# Patient Record
Sex: FEMALE | Race: WHITE | Employment: FULL TIME | URBAN - METROPOLITAN AREA
[De-identification: names, ages, dates, MRNs, and addresses within clinical notes are randomized per-mention and may not be internally consistent; named-entity substitution may affect disease eponyms.]

---

## 2020-06-10 ENCOUNTER — OFFICE VISIT (OUTPATIENT)
Dept: FAMILY MEDICINE | Facility: CLINIC | Age: 55
End: 2020-06-10
Payer: COMMERCIAL

## 2020-06-10 VITALS
OXYGEN SATURATION: 100 % | HEART RATE: 84 BPM | TEMPERATURE: 98.4 F | SYSTOLIC BLOOD PRESSURE: 140 MMHG | BODY MASS INDEX: 24.63 KG/M2 | DIASTOLIC BLOOD PRESSURE: 82 MMHG | WEIGHT: 139 LBS | HEIGHT: 63 IN

## 2020-06-10 DIAGNOSIS — M79.7 FIBROMYALGIA: ICD-10-CM

## 2020-06-10 DIAGNOSIS — R03.0 ELEVATED BLOOD PRESSURE READING WITHOUT DIAGNOSIS OF HYPERTENSION: ICD-10-CM

## 2020-06-10 DIAGNOSIS — M16.12 OSTEOARTHRITIS OF LEFT HIP, UNSPECIFIED OSTEOARTHRITIS TYPE: ICD-10-CM

## 2020-06-10 DIAGNOSIS — Z01.818 PREOP GENERAL PHYSICAL EXAM: Primary | ICD-10-CM

## 2020-06-10 PROBLEM — F33.9 RECURRENT MAJOR DEPRESSIVE DISORDER (H): Status: ACTIVE | Noted: 2020-06-10

## 2020-06-10 LAB — HGB BLD-MCNC: 13 G/DL (ref 11.7–15.7)

## 2020-06-10 PROCEDURE — 36415 COLL VENOUS BLD VENIPUNCTURE: CPT | Performed by: FAMILY MEDICINE

## 2020-06-10 PROCEDURE — 99203 OFFICE O/P NEW LOW 30 MIN: CPT | Performed by: FAMILY MEDICINE

## 2020-06-10 PROCEDURE — 85018 HEMOGLOBIN: CPT | Performed by: FAMILY MEDICINE

## 2020-06-10 RX ORDER — DEXTROAMPHETAMINE SACCHARATE, AMPHETAMINE ASPARTATE MONOHYDRATE, DEXTROAMPHETAMINE SULFATE AND AMPHETAMINE SULFATE 5; 5; 5; 5 MG/1; MG/1; MG/1; MG/1
20 CAPSULE, EXTENDED RELEASE ORAL
COMMUNITY
Start: 2019-09-12

## 2020-06-10 RX ORDER — METHYLPHENIDATE HYDROCHLORIDE 10 MG/1
10 TABLET ORAL
COMMUNITY
Start: 2019-09-13

## 2020-06-10 RX ORDER — DULOXETIN HYDROCHLORIDE 60 MG/1
CAPSULE, DELAYED RELEASE ORAL
COMMUNITY
Start: 2019-09-12

## 2020-06-10 RX ORDER — HYDROCODONE BITARTRATE AND ACETAMINOPHEN 5; 325 MG/1; MG/1
1 TABLET ORAL EVERY 6 HOURS PRN
Qty: 12 TABLET | Refills: 0 | Status: SHIPPED | OUTPATIENT
Start: 2020-06-10 | End: 2020-06-13

## 2020-06-10 SDOH — HEALTH STABILITY: MENTAL HEALTH: HOW OFTEN DO YOU HAVE 6 OR MORE DRINKS ON ONE OCCASION?: NEVER

## 2020-06-10 SDOH — HEALTH STABILITY: MENTAL HEALTH: HOW OFTEN DO YOU HAVE A DRINK CONTAINING ALCOHOL?: 4 OR MORE TIMES A WEEK

## 2020-06-10 SDOH — HEALTH STABILITY: MENTAL HEALTH: HOW MANY STANDARD DRINKS CONTAINING ALCOHOL DO YOU HAVE ON A TYPICAL DAY?: 1 OR 2

## 2020-06-10 ASSESSMENT — MIFFLIN-ST. JEOR: SCORE: 1199.63

## 2020-06-10 NOTE — PROGRESS NOTES
[facility]  [address]  [facility] [id number] [phone]  Dept: [phone]    PRE-OP EVALUATION:  Today's date: 6/10/2020    Carito John (: 1965) presents for pre-operative evaluation assessment as requested by Dr. rdz.  She requires evaluation and anesthesia risk assessment prior to undergoing surgery/procedure for treatment of left hip total replacement .    Proposed Surgery/ Procedure: left hip replacement -   Date of Surgery/ Procedure: 06/15/2020  Time of Surgery/ Procedure:  6:30am  Hospital/Surgical Facility: [facility]     Primary Physician: Melinda Hdez  Type of Anesthesia Anticipated: General    Patient has a Health Care Directive or Living Will:  NO    1. NO - Do you have a history of heart attack, stroke, stent, bypass or surgery on an artery in the head, neck, heart or legs?  2. NO - Do you ever have any pain or discomfort in your chest?  3. NO - Do you have a history of  Heart Failure?  4. NO - Are you troubled by shortness of breath when: walking on the level, up a slight hill or at night?  5. NO - Do you currently have a cold, bronchitis or other respiratory infection?  6. NO - Do you have a cough, shortness of breath or wheezing?  7. NO - Do you sometimes get pains in the calves of your legs when you walk?  8. NO - Do you or anyone in your family have previous history of blood clots?  9. YES - DO YOU OR DOES ANYONE IN YOUR FAMILY HAVE A SERIOUS BLEEDING PROBLEM SUCH AS PROLONGED BLEEDING FOLLOWING SURGERIES OR CUTS? Father - bleeding due to blood thinners  10. NO - Have you ever had problems with anemia or been told to take iron pills?  11. NO - Have you had any abnormal blood loss such as black, tarry or bloody stools, or abnormal vaginal bleeding?  12. NO - Have you ever had a blood transfusion?  13. NO - Have you or any of your relatives ever had problems with anesthesia?  14. NO - Do you have sleep apnea, excessive snoring or daytime  drowsiness?  15. NO - Do you have any prosthetic heart valves?  16. NO - Do you have prosthetic joints?  17. NO - Is there any chance that you may be pregnant?      HPI:     HPI related to upcoming procedure: 55 y/o female her for preoperative evaluation for left hip surgery for OA and possibly osteonecrosis      See problem list for active medical problems.  Problems all longstanding and stable, except as noted/documented.  See ROS for pertinent symptoms related to these conditions.      MEDICAL HISTORY:     Patient Active Problem List    Diagnosis Date Noted     Osteoarthritis of left hip 06/10/2020     Priority: Medium     Recurrent major depressive disorder (H) 06/10/2020     Priority: Medium     Fibromyalgia 06/10/2020     Priority: Medium      No past medical history on file.  Past Surgical History:   Procedure Laterality Date     C BREAST AUGMENTATION  04/2016     DENTAL SURGERY      wisdom teeth and implant     Current Outpatient Medications   Medication Sig Dispense Refill     amphetamine-dextroamphetamine (ADDERALL XR) 20 MG 24 hr capsule Take 20 mg by mouth       diphenhydrAMINE HCl (BENADRYL ALLERGY PO)        DULoxetine (CYMBALTA) 60 MG capsule TAKE 1 CAPSULE BY MOUTH EVERY DAY (DOSE INCREASE)       methylphenidate (RITALIN) 10 MG tablet Take 10 mg by mouth       Omega-3 Fatty Acids (FISH OIL PO)        TURMERIC PO        OTC products: was on ibuprofen but held prior to surgery     No Known Allergies   Latex Allergy: NO    Social History     Tobacco Use     Smoking status: Former Smoker     Smokeless tobacco: Never Used   Substance Use Topics     Alcohol use: Yes     Frequency: 4 or more times a week     Drinks per session: 1 or 2     Binge frequency: Never     History   Drug Use     Comment: medical [facility]       REVIEW OF SYSTEMS:   CONSTITUTIONAL: NEGATIVE for fever, chills, change in weight  INTEGUMENTARY/SKIN: NEGATIVE for worrisome rashes, moles or lesions  EYES: NEGATIVE for vision changes or  "irritation  ENT/MOUTH: NEGATIVE for ear, mouth and throat problems  RESP: NEGATIVE for significant cough or SOB  BREAST: NEGATIVE for masses, tenderness or discharge  CV: NEGATIVE for chest pain, palpitations or peripheral edema  GI: NEGATIVE for nausea, abdominal pain, heartburn, or change in bowel habits  : NEGATIVE for frequency, dysuria, or hematuria  MUSCULOSKELETAL:hip pain  NEURO: NEGATIVE for weakness, dizziness or paresthesias  ENDOCRINE: NEGATIVE for temperature intolerance, skin/hair changes  HEME: NEGATIVE for bleeding problems  PSYCHIATRIC: NEGATIVE for changes in mood or affect    EXAM:   BP (!) 140/82   Pulse 84   Temp 98.4  F (36.9  C) (Tympanic)   Ht 1.6 m (5' 3\")   Wt 63 kg (139 lb)   SpO2 100%   BMI 24.62 kg/m      GENERAL APPEARANCE: healthy, alert and no distress     EYES: EOMI, PERRL     HENT: ear canals and TM's normal and nose and mouth without ulcers or lesions     NECK: no adenopathy, no asymmetry, masses, or scars and thyroid normal to palpation     RESP: lungs clear to auscultation - no rales, rhonchi or wheezes     CV: regular rates and rhythm, normal S1 S2, no S3 or S4 and no murmur, click or rub     ABDOMEN:  soft, nontender, no HSM or masses and bowel sounds normal     MS: extremities normal- no gross deformities noted, no evidence of inflammation in joints, FROM in all extremities.     SKIN: no suspicious lesions or rashes     NEURO: Normal strength and tone, sensory exam grossly normal, mentation intact and speech normal     PSYCH: mentation appears normal. and affect normal/bright     LYMPHATICS: No cervical adenopathy    DIAGNOSTICS:     Labs Resulted Today:   Results for orders placed or performed in visit on 06/10/20   Hemoglobin     Status: None   Result Value Ref Range    Hemoglobin 13.0 11.7 - 15.7 g/dL       No results for input(s): HGB, PLT, INR, NA, POTASSIUM, CR, A1C in the last 51902 hours.     IMPRESSION:   Reason for surgery/procedure: 53 y/o female here for " preoperative evaluation for left hip replacement     The proposed surgical procedure is considered INTERMEDIATE risk.    REVISED CARDIAC RISK INDEX  The patient has the following serious cardiovascular risks for perioperative complications such as (MI, PE, VFib and 3  AV Block):  No serious cardiac risks  INTERPRETATION: 0 risks: Class I (very low risk - 0.4% complication rate)    The patient has the following additional risks for perioperative complications:  No identified additional risks      ICD-10-CM    1. Preop general physical exam  Z01.818    2. Osteoarthritis of left hip, unspecified osteoarthritis type  M16.12    3. Fibromyalgia  M79.7        RECOMMENDATIONS:       --Patient is to take all scheduled medications on the day of surgery EXCEPT for modifications listed below.  HOLD all NSAIDS, supplements (including omega fish oil) and ritalin    APPROVAL GIVEN to proceed with proposed procedure, without further diagnostic evaluation    Pt blood pressure borderline - advised to monitor her diet and RTC in 1-2 months to recheck - if still high consider medication        Signed Electronically by: Melinda Hdez DO    Copy of this evaluation report is provided to requesting physician.    [facility] Preop Guidelines    Revised Cardiac Risk Index

## 2020-06-10 NOTE — LETTER
June 11, 2020      Carito John  [address] [facility] [address] [place] [id number]        Dear ,    We are writing to inform you of your test results.    Enclosed is a copy of your results. If you have any questions, please contact us at [phone].         -Hemoglobin is normal.  There is no evidence of anemia.     Thanks,   Melinda Hdez DO    Resulted Orders   Hemoglobin   Result Value Ref Range    Hemoglobin 13.0 11.7 - 15.7 g/dL       If you have any questions or concerns, please call the clinic at the number listed above.       Sincerely,        Melinda Hdez, DO

## 2020-06-11 NOTE — RESULT ENCOUNTER NOTE
Please send copy of results with a letter:    Enclosed is a copy of your results. If you have any questions, please contact us at [phone].      -Hemoglobin is normal.  There is no evidence of anemia.    Thanks,   Melinda Hdez, DO

## 2020-07-01 ENCOUNTER — TELEPHONE (OUTPATIENT)
Dept: FAMILY MEDICINE | Facility: CLINIC | Age: 55
End: 2020-07-01

## 2020-07-01 NOTE — LETTER
July 1, 2020      Carito John  [address]  [place] [address]      Magdaleno Arzate,    We care about your health and have reviewed your health plan and are making recommendations based on this review to optimize your health.    You are in particular need of attention regarding:   Breast Cancer Screening    At this time Dr Hdez asked that we reach out and provide you information to schedule your yearly mammogram.     Screening Mammogram Scheduling:  [facility] [place] [phone]  [facility] [phone]  [place] [phone]  Central Scheduling for All Locations [phone]    If you are underinsured or uninsured, we recommend you contact [facility].   They offer mammograms on a sliding fee charge.   You can schedule with them at [phone].    Sincerely,  Bri CHACON RN

## 2020-07-01 NOTE — TELEPHONE ENCOUNTER
Patient Quality Outreach      Summary:    Patient is due/failing the following:   Breast Cancer Screening - Mammogram    Type of outreach:    Sent letter.    Questions for provider review:    None                                                                                   **Start Working phrase here:**       Patient has the following on her problem list/HM: None                                                Bri CHACON RN

## 2021-01-08 ENCOUNTER — TELEPHONE (OUTPATIENT)
Dept: FAMILY MEDICINE | Facility: CLINIC | Age: 56
End: 2021-01-08
Payer: COMMERCIAL

## 2021-01-08 NOTE — TELEPHONE ENCOUNTER
Patient Quality Outreach      Summary:    Patient is due/failing the following:   Breast Cancer Screening - Mammogram    Type of outreach:    Phone, spoke to patient/parent. Patient would not like to schedule a mammo.    Questions for provider review:    None                                                                                                                                     Piper Fenton     Chart routed to Care Team.

## 2021-01-15 ENCOUNTER — HEALTH MAINTENANCE LETTER (OUTPATIENT)
Age: 56
End: 2021-01-15

## 2021-10-24 ENCOUNTER — HEALTH MAINTENANCE LETTER (OUTPATIENT)
Age: 56
End: 2021-10-24

## 2022-02-13 ENCOUNTER — HEALTH MAINTENANCE LETTER (OUTPATIENT)
Age: 57
End: 2022-02-13

## 2022-10-16 ENCOUNTER — HEALTH MAINTENANCE LETTER (OUTPATIENT)
Age: 57
End: 2022-10-16

## 2023-03-26 ENCOUNTER — HEALTH MAINTENANCE LETTER (OUTPATIENT)
Age: 58
End: 2023-03-26